# Patient Record
Sex: MALE | Race: WHITE | NOT HISPANIC OR LATINO | Employment: FULL TIME | URBAN - METROPOLITAN AREA
[De-identification: names, ages, dates, MRNs, and addresses within clinical notes are randomized per-mention and may not be internally consistent; named-entity substitution may affect disease eponyms.]

---

## 2022-04-09 ENCOUNTER — HISTORICAL (OUTPATIENT)
Dept: ADMINISTRATIVE | Facility: HOSPITAL | Age: 37
End: 2022-04-09

## 2022-04-25 VITALS
OXYGEN SATURATION: 96 % | HEIGHT: 73 IN | SYSTOLIC BLOOD PRESSURE: 138 MMHG | WEIGHT: 233.44 LBS | DIASTOLIC BLOOD PRESSURE: 70 MMHG | BODY MASS INDEX: 30.94 KG/M2

## 2022-07-13 PROBLEM — F98.8 ATTENTION DEFICIT DISORDER: Status: ACTIVE | Noted: 2022-07-13

## 2023-01-10 PROBLEM — Z00.00 WELLNESS EXAMINATION: Status: ACTIVE | Noted: 2023-01-10

## 2023-04-17 PROBLEM — Z00.00 WELLNESS EXAMINATION: Status: RESOLVED | Noted: 2023-01-10 | Resolved: 2023-04-17

## 2023-12-30 ENCOUNTER — OFFICE VISIT (OUTPATIENT)
Dept: URGENT CARE | Facility: CLINIC | Age: 38
End: 2023-12-30
Payer: COMMERCIAL

## 2023-12-30 VITALS
HEIGHT: 73 IN | HEART RATE: 79 BPM | OXYGEN SATURATION: 100 % | WEIGHT: 240 LBS | SYSTOLIC BLOOD PRESSURE: 134 MMHG | DIASTOLIC BLOOD PRESSURE: 89 MMHG | TEMPERATURE: 99 F | BODY MASS INDEX: 31.81 KG/M2 | RESPIRATION RATE: 18 BRPM

## 2023-12-30 DIAGNOSIS — H66.91 RIGHT OTITIS MEDIA, UNSPECIFIED OTITIS MEDIA TYPE: Primary | ICD-10-CM

## 2023-12-30 DIAGNOSIS — H60.391 OTHER INFECTIVE OTITIS EXTERNA OF RIGHT EAR, UNSPECIFIED CHRONICITY: ICD-10-CM

## 2023-12-30 DIAGNOSIS — H61.21 RIGHT EAR IMPACTED CERUMEN: ICD-10-CM

## 2023-12-30 PROCEDURE — 99214 PR OFFICE/OUTPT VISIT, EST, LEVL IV, 30-39 MIN: ICD-10-PCS | Mod: 25,,, | Performed by: FAMILY MEDICINE

## 2023-12-30 PROCEDURE — 69209 EAR CERUMEN REMOVAL: ICD-10-PCS | Mod: RT,,, | Performed by: FAMILY MEDICINE

## 2023-12-30 PROCEDURE — 69209 REMOVE IMPACTED EAR WAX UNI: CPT | Mod: RT,,, | Performed by: FAMILY MEDICINE

## 2023-12-30 PROCEDURE — 99214 OFFICE O/P EST MOD 30 MIN: CPT | Mod: 25,,, | Performed by: FAMILY MEDICINE

## 2023-12-30 RX ORDER — CEFDINIR 300 MG/1
300 CAPSULE ORAL 2 TIMES DAILY
Qty: 14 CAPSULE | Refills: 0 | Status: SHIPPED | OUTPATIENT
Start: 2023-12-30 | End: 2024-01-06

## 2023-12-30 NOTE — PROGRESS NOTES
"Subjective:      Patient ID: Da Roach is a 38 y.o. male.    Vitals:  height is 6' 1" (1.854 m) and weight is 108.9 kg (240 lb). His temperature is 98.5 °F (36.9 °C). His blood pressure is 134/89 and his pulse is 79. His respiration is 18 and oxygen saturation is 100%.     Chief Complaint: Ear Problem (Decreased hearing/loss of hearing completely in right ear. Went to another clinic 2 days ago and was told that his ear canal was closing and they inserted a wick w/ rx Cipro. Wick fell out yesterday. No improvement in loss of hearing. )    R ear pain and reduced swelling. Dx OE and given cipro and wick 2 days ago without improvement.  Continued reduced hearing and tinnitus.          Constitution: Negative for sweating, fatigue and fever.   HENT:  Positive for ear pain.       Objective:     Physical Exam   HENT:   Ears:   Right Ear: External ear normal.   Left Ear: Tympanic membrane, external ear and ear canal normal.      Comments: White and yellow exudate to the R canal with TM erythema.  Distal R canal with edema otherwise no diffuse edema.   Nose: Nose normal.   Mouth/Throat: Mucous membranes are moist.   Eyes: Pupils are equal, round, and reactive to light.   Cardiovascular: Normal rate.   Pulmonary/Chest: Effort normal.   Abdominal: Normal appearance.   Neurological: no focal deficit. He is alert.   Psychiatric: Mood normal.   Nursing note and vitals reviewed.      Assessment:     1. Right otitis media, unspecified otitis media type    2. Other infective otitis externa of right ear, unspecified chronicity    3. Right ear impacted cerumen        Plan:       Right otitis media, unspecified otitis media type  -     cefdinir (OMNICEF) 300 MG capsule; Take 1 capsule (300 mg total) by mouth 2 (two) times daily. for 7 days  Dispense: 14 capsule; Refill: 0    Other infective otitis externa of right ear, unspecified chronicity    Right ear impacted cerumen    Other orders  -     Ear Cerumen Removal           Ear " Cerumen Removal    Date/Time: 12/30/2023 9:30 AM    Performed by: Brielle Roberts MD  Authorized by: Brielle Roberts MD    Consent Done?:  Yes (Written)  Medication Used:  Other  Location details:  Right ear  Procedure type: irrigation    Cerumen  Removal Results:  Cerumen completely removed  Patient tolerance:  Patient tolerated the procedure well with no immediate complications

## 2024-01-18 PROBLEM — R03.0 ELEVATED BLOOD PRESSURE READING IN OFFICE WITHOUT DIAGNOSIS OF HYPERTENSION: Status: ACTIVE | Noted: 2024-01-18

## 2024-04-15 PROBLEM — Z00.00 WELLNESS EXAMINATION: Status: RESOLVED | Noted: 2023-01-10 | Resolved: 2024-04-15

## 2024-04-20 NOTE — PROGRESS NOTES
"Medication Refill       HPI:    Patient presents for 3 month recheck/refill medications.  Patient states medications are working well; he is able to concentrate, focus and stay on task.  He denies insomnia, anxiety, palpitations, unintentional weight loss, headaches or dry mouth.     reviewed.    Narcotics agreement updated 01/18/2024.      Current Outpatient Medications   Medication Instructions    lisdexamfetamine (VYVANSE) 50 mg, Oral, Every morning    lisdexamfetamine (VYVANSE) 50 mg, Oral, Every morning, Fill: 5/22/2024.    lisdexamfetamine (VYVANSE) 50 mg, Oral, Every morning, Fill: 6/21/2024    multivitamin (THERAGRAN) tablet 1 tablet, Oral, Daily         ROS:    Patient has been feeling well.    He denies any recent illnesses.    He has been sleeping well.    His appetite is good.    He is starting to exercise again.      PE:    ..Visit Vitals  /74   Pulse 72   Temp 97.7 °F (36.5 °C)   Ht 6' 1" (1.854 m)   Wt 109.3 kg (241 lb)   SpO2 100%   BMI 31.80 kg/m²        General:  He is well-developed well-nourished white male in no apparent distress.  He is alert and oriented.    Chest: Clear to auscultation bilaterally.    CV: Regular rate rhythm without murmurs rubs or gallops.        1. ADHD (attention deficit hyperactivity disorder), inattentive type  Overview:  Stable/patient doing well on current treatment, will continue to closely monitor, follow-up in 3 months. 3 prescriptions printed and hand given to patient at office visit today for 3-month supply. Risks/benefits/side effects of medication discussed with patient. Patient denies any insomnia or palpitations.    Narcotics agreement updated 01/11/2023.    07/20/2023: Patient is doing well on medications; refills x3 given.    10/20/2023: Patient is doing well on medications; refills x3 given.    01/18/2024: Patient is doing well on medications; refills x3 given.  Narcotics agreement updated.    04/23/2024:  Patient is doing well on medications; " refills x3 given.                Other orders  -     lisdexamfetamine (VYVANSE) 50 MG capsule; Take 1 capsule (50 mg total) by mouth every morning.  Dispense: 30 capsule; Refill: 0  -     lisdexamfetamine (VYVANSE) 50 MG capsule; Take 1 capsule (50 mg total) by mouth every morning. Fill: 5/22/2024.  Dispense: 30 capsule; Refill: 0  -     lisdexamfetamine (VYVANSE) 50 MG capsule; Take 1 capsule (50 mg total) by mouth every morning. Fill: 6/21/2024  Dispense: 30 capsule; Refill: 0              ..Follow up in about 3 months (around 7/23/2024) for Wellness.       Future Appointments   Date Time Provider Department Center   7/17/2024  8:30 AM NURSE, [facility] PRIMARY [facility]ARE [facility]   7/18/2024  8:30 AM Eduar Gastelum MD [facility]

## 2024-04-23 ENCOUNTER — OFFICE VISIT (OUTPATIENT)
Dept: PRIMARY CARE CLINIC | Facility: CLINIC | Age: 39
End: 2024-04-23
Payer: COMMERCIAL

## 2024-04-23 VITALS
SYSTOLIC BLOOD PRESSURE: 132 MMHG | BODY MASS INDEX: 31.94 KG/M2 | HEART RATE: 72 BPM | WEIGHT: 241 LBS | TEMPERATURE: 98 F | HEIGHT: 73 IN | OXYGEN SATURATION: 100 % | DIASTOLIC BLOOD PRESSURE: 74 MMHG

## 2024-04-23 DIAGNOSIS — F90.0 ADHD (ATTENTION DEFICIT HYPERACTIVITY DISORDER), INATTENTIVE TYPE: Primary | ICD-10-CM

## 2024-04-23 PROCEDURE — 99213 OFFICE O/P EST LOW 20 MIN: CPT | Mod: ,,, | Performed by: FAMILY MEDICINE

## 2024-04-23 PROCEDURE — 3078F DIAST BP <80 MM HG: CPT | Mod: CPTII,,, | Performed by: FAMILY MEDICINE

## 2024-04-23 PROCEDURE — 1159F MED LIST DOCD IN RCRD: CPT | Mod: CPTII,,, | Performed by: FAMILY MEDICINE

## 2024-04-23 PROCEDURE — 3075F SYST BP GE 130 - 139MM HG: CPT | Mod: CPTII,,, | Performed by: FAMILY MEDICINE

## 2024-04-23 PROCEDURE — 3008F BODY MASS INDEX DOCD: CPT | Mod: CPTII,,, | Performed by: FAMILY MEDICINE

## 2024-04-23 RX ORDER — LISDEXAMFETAMINE DIMESYLATE 50 MG/1
50 CAPSULE ORAL EVERY MORNING
Qty: 30 CAPSULE | Refills: 0 | Status: SHIPPED | OUTPATIENT
Start: 2024-04-23

## 2024-04-23 RX ORDER — LISDEXAMFETAMINE DIMESYLATE 50 MG/1
50 CAPSULE ORAL EVERY MORNING
Qty: 30 CAPSULE | Refills: 0 | Status: SHIPPED | OUTPATIENT
Start: 2024-04-23 | End: 2024-05-23

## 2024-07-11 DIAGNOSIS — Z00.00 WELLNESS EXAMINATION: Primary | ICD-10-CM

## 2024-07-18 ENCOUNTER — TELEPHONE (OUTPATIENT)
Dept: PRIMARY CARE CLINIC | Facility: CLINIC | Age: 39
End: 2024-07-18

## 2024-07-18 RX ORDER — LISDEXAMFETAMINE DIMESYLATE 50 MG/1
50 CAPSULE ORAL EVERY MORNING
Qty: 30 CAPSULE | Refills: 0 | Status: SHIPPED | OUTPATIENT
Start: 2024-07-18

## 2024-07-18 NOTE — TELEPHONE ENCOUNTER
----- Message from Ashley Hairston sent at 7/18/2024  9:15 AM CDT -----  .Who Called: Da Roach    Refill or New Rx:Refill  RX Name and Strength:lisdexamfetamine (VYVANSE) 50 MG capsule  How is the patient currently taking it? (ex. 1XDay):1x day  Is this a 30 day or 90 day RX:30  Local or Mail Order:local  List of preferred pharmacies on file (remove unneeded): [unfilled]  If different Pharmacy is requested, enter Pharmacy information here including location and phone number: same    Ordering Provider:Nirav      Preferred Method of Contact: Phone Call  Patient's Preferred Phone Number on File: [phone]   Best Call Back Number, if different:  Additional Information: pt overslept but asking for refill and will reschedule

## 2024-07-18 NOTE — TELEPHONE ENCOUNTER
Rescheduled appt to 7/30/24 would like to  1 prescription of  VYANSE today. Also states will do lab work tomorrow.

## 2024-07-26 NOTE — PROGRESS NOTES
..Annual Exam and Medication Refill (3 month med refill)       HPI:     Patient presents for wellness examination.    He has been feeling well.    He and his family just went to [place].   He has been sleeping well.   His appetite is normal   He is  with 3 children  He is a ; Timmy Tolliver.     He has 1-2 cups coffee a day.    He has alcohol 2-3 times a week.    He does not smoke.    He could exercise recently secondary to left foot injury.  Derm: Dr Montesinos; history of BCCA face.      The patient's Health Maintenance was reviewed and the following appears to be due at this time:   Health Maintenance Due   Topic Date Due    Hepatitis C Screening  Never done    HIV Screening  Never done    Hemoglobin A1c (Diabetic Prevention Screening)  Never done    COVID-19 Vaccine (3 - 2023-24 season) 09/01/2023       ..  Past Medical History:   Diagnosis Date    ADHD (attention deficit hyperactivity disorder), inattentive type     Refused influenza vaccine           ..  Past Surgical History:   Procedure Laterality Date    ABSCESS DRAINAGE      VASECTOMY            Current Outpatient Medications   Medication Instructions    lisdexamfetamine (VYVANSE) 50 mg, Oral, Every morning    lisdexamfetamine (VYVANSE) 50 mg, Oral, Every morning, Fill: 8/30/2024.    lisdexamfetamine (VYVANSE) 50 mg, Oral, Every morning, Fill: 9/30/2024.    multivitamin (THERAGRAN) tablet 1 tablet, Oral, Daily         ..  Social History     Socioeconomic History    Marital status:     Number of children: 2   Occupational History    Occupation:    Tobacco Use    Smoking status: Never    Smokeless tobacco: Never   Substance and Sexual Activity    Alcohol use: Yes     Alcohol/week: 1.0 - 2.0 standard drink of alcohol     Types: 1 - 2 Standard drinks or equivalent per week     Comment: occasional    Drug use: Never    Sexual activity: Yes     Partners: Female          ..  Family History   Problem Relation Name  Age of Onset    No Known Problems Mother      Hypertension Father      Prostate cancer Father      Skin cancer Father      No Known Problems Brother            ..  Review of patient's allergies indicates:   Allergen Reactions    Penicillins Other (See Comments)          ..  Immunization History   Administered Date(s) Administered    COVID-19, MRNA, LN-S, PF (Pfizer) (Purple Cap) 07/10/2021, 08/10/2021    DTP 1986, 01/19/1987, 03/19/1987, 04/13/1989, 08/20/1992    HIB 08/17/1990    Influenza - Quadrivalent - PF *Preferred* (6 months and older) 11/22/2019, 11/17/2020    Influenza - Trivalent - PF (ADULT) 11/22/2019, 11/17/2020    MMR 02/08/1988, 08/20/1992    OPV 1986, 01/28/1987, 04/13/1989, 08/20/1992    Td (ADULT) 07/12/2005, 07/12/2005    Tdap 11/06/2015          REVIEW OF SYSTEMS:    GENERAL: No weight loss, no weight gain, no fever, no fatigue, no chills, no night sweats  HEENT: No sore throat, no ear pain, no sinus pressure, no nasal congestion, no rhinorrhea, + decreased hearing, he saw audiologist, mild hearing loss on left, + tinnitus on left,  no snoring  VISION: No vision changes, no blurry vision, no double vision, no glaucoma, no cataracts, + glasses and contacts  LAST EYE EXAM: February/March 2023  NECK: no LAD  CARDIAC: No chest pain, no palpitations, no dyspnea on exertion, no orthopnea  RESPIRATORY: No cough, no wheezing, no sputum production, no SOB  GI: No abdominal pain, no N/V, no heartburn, no constipation, no diarrhea, no blood in stool, (- ) family history of Colon Ca  : No dysuria, no hematuria, no frequency, no urgency, no incontinence, no testicular pain/swelling, (+ ) family history of Prostate Ca: father diagnosed around age 65  MUSC/SKEL: No myalgia, no weakness, no edema, no arthralgia, no joint swelling/effusions  SKIN: No rashes, no hives, no itching, no sores  NEURO: No HA, no numbness, no tingling, no weakness, no dizziness  PSYCH: No anxiety, no depression, no  "irritability, no panic attacks, no s/i, no h/i, no hallucinations  ENDO: No polyuria, no polyphagia, no polydipsia  HEME: No bruising, no bleeding disorders, no signs of anemia.       PHYSICAL EXAM:    ..Visit Vitals  BP (!) (P) 162/102   Pulse 74   Temp 97.4 °F (36.3 °C)   Ht 6' 1" (1.854 m)   Wt 115.2 kg (253 lb 14.4 oz)   SpO2 98%   BMI 33.50 kg/m²        General: Well developed, well nourished white male in no apparent distress, alert and oriented x3  Skin: No rash or abnormal lesions  HEENT: Normocephalic, PERRLA, EOMI, mouth WNL, throat WNL, nares normal, EAC and TM WNL bilaterally  Neck: FROM, no LAD, no thyroid abnormalities palpable  Chest: CTA bilaterally, no wheezes crackles or rubs  Cardiac: RRR, no murmurs, rubs, gallops  Abdomen: Soft, nontender, nondistended, NBSx4, no rebound tenderness or guarding, no HSM  Extremities: No clubbing, cyanosis, or edema. Joints WNL, +2 DP/PT pulses bilaterally  Neuro: No sensory or motor defects noted. CN II-XII intact. Gait WNL.  Genital: normal testes, no hernias  Rectal:  Deferred      1. Wellness examination  Overview:  CBC, CMP, Lipids, UA ordered today; patient will return as he is not fasting.    He has been feeling well.    He has resumed exercising.  Derm:  Dr. Montesinos; has not seen him in over a year.  Patient advised to schedule follow-up.  Patient is without complaints or concerns at this time.    07/30/2024:  Patient presents for wellness examination.    He has been feeling well.  Patient injured left foot; it was sore for 5 weeks.    Patient advised to resume exercising regularly.  Patient's blood pressure is elevated x2 today; patient encouraged to monitor blood pressures and let us know how they are doing in 2 weeks.    His ADD is doing well on medications; patient advised to not take medication while blood pressure is elevated.  Labs pending; patient has order.        2. ADHD (attention deficit hyperactivity disorder), inattentive " type  Overview:  Stable/patient doing well on current treatment, will continue to closely monitor, follow-up in 3 months. 3 prescriptions printed and hand given to patient at office visit today for 3-month supply. Risks/benefits/side effects of medication discussed with patient. Patient denies any insomnia or palpitations.    Narcotics agreement updated 01/11/2023.    07/20/2023: Patient is doing well on medications; refills x3 given.    10/20/2023: Patient is doing well on medications; refills x3 given.    01/18/2024: Patient is doing well on medications; refills x3 given.  Narcotics agreement updated.    04/23/2024:  Patient is doing well on medications; refills x3 given.      07/30/2024: Patient is doing well on medications; refills x3 given.    Patient advised to not take medication while blood pressure is elevated.            3. Elevated blood pressure reading in office without diagnosis of hypertension  Overview:  Patient's blood pressure is elevated x2 today..  Patient advised to monitor pressures at home and let us know how they are doing.    Decrease sodium consumption.    Patient encouraged to lose weight.    07/30/2024: Patient advised to monitor blood pressures at home.  His readings are quite elevated today.    Patient advised to not take Vyvanse while he is readings are elevated.  Patient is to contact us in 2 weeks with his readings.  Patient advised to limit sodium consumption.      Other orders  -     lisdexamfetamine (VYVANSE) 50 MG capsule; Take 1 capsule (50 mg total) by mouth every morning.  Dispense: 30 capsule; Refill: 0  -     lisdexamfetamine (VYVANSE) 50 MG capsule; Take 1 capsule (50 mg total) by mouth every morning. Fill: 8/30/2024.  Dispense: 30 capsule; Refill: 0  -     lisdexamfetamine (VYVANSE) 50 MG capsule; Take 1 capsule (50 mg total) by mouth every morning. Fill: 9/30/2024.  Dispense: 30 capsule; Refill: 0         ..Follow up in about 3 months (around 10/30/2024) for ADD Follow  Up.     Future Appointments   Date Time Provider Department Center   10/29/2024  9:30 AM Eduar Gastelum MD [facility]    8/5/2025  9:30 AM Eduar Gastelum MD [facility]

## 2024-07-30 ENCOUNTER — OFFICE VISIT (OUTPATIENT)
Dept: PRIMARY CARE CLINIC | Facility: CLINIC | Age: 39
End: 2024-07-30
Payer: COMMERCIAL

## 2024-07-30 VITALS
DIASTOLIC BLOOD PRESSURE: 98 MMHG | TEMPERATURE: 97 F | HEART RATE: 74 BPM | HEIGHT: 73 IN | BODY MASS INDEX: 33.65 KG/M2 | WEIGHT: 253.88 LBS | SYSTOLIC BLOOD PRESSURE: 158 MMHG | OXYGEN SATURATION: 98 %

## 2024-07-30 DIAGNOSIS — R03.0 ELEVATED BLOOD PRESSURE READING IN OFFICE WITHOUT DIAGNOSIS OF HYPERTENSION: ICD-10-CM

## 2024-07-30 DIAGNOSIS — Z00.00 WELLNESS EXAMINATION: Primary | ICD-10-CM

## 2024-07-30 DIAGNOSIS — F90.0 ADHD (ATTENTION DEFICIT HYPERACTIVITY DISORDER), INATTENTIVE TYPE: ICD-10-CM

## 2024-07-30 PROCEDURE — 3008F BODY MASS INDEX DOCD: CPT | Mod: CPTII,,, | Performed by: FAMILY MEDICINE

## 2024-07-30 PROCEDURE — 99395 PREV VISIT EST AGE 18-39: CPT | Mod: ,,, | Performed by: FAMILY MEDICINE

## 2024-07-30 PROCEDURE — 1160F RVW MEDS BY RX/DR IN RCRD: CPT | Mod: CPTII,,, | Performed by: FAMILY MEDICINE

## 2024-07-30 PROCEDURE — 1159F MED LIST DOCD IN RCRD: CPT | Mod: CPTII,,, | Performed by: FAMILY MEDICINE

## 2024-07-30 PROCEDURE — 3077F SYST BP >= 140 MM HG: CPT | Mod: CPTII,,, | Performed by: FAMILY MEDICINE

## 2024-07-30 PROCEDURE — 3080F DIAST BP >= 90 MM HG: CPT | Mod: CPTII,,, | Performed by: FAMILY MEDICINE

## 2024-07-30 RX ORDER — LISDEXAMFETAMINE DIMESYLATE 50 MG/1
50 CAPSULE ORAL EVERY MORNING
Qty: 30 CAPSULE | Refills: 0 | Status: SHIPPED | OUTPATIENT
Start: 2024-07-30 | End: 2024-08-29

## 2024-07-30 RX ORDER — LISDEXAMFETAMINE DIMESYLATE 50 MG/1
50 CAPSULE ORAL EVERY MORNING
Qty: 30 CAPSULE | Refills: 0 | Status: SHIPPED | OUTPATIENT
Start: 2024-07-30

## 2024-08-22 ENCOUNTER — TELEPHONE (OUTPATIENT)
Dept: PRIMARY CARE CLINIC | Facility: CLINIC | Age: 39
End: 2024-08-22
Payer: COMMERCIAL

## 2024-08-22 NOTE — TELEPHONE ENCOUNTER
----- Message from Ashley Hairston sent at 8/22/2024 12:11 PM CDT -----  .Who Called: Da Roach        Preferred Method of Contact: Phone Call  Patient's Preferred Phone Number on File: [phone]   Best Call Back Number, if different:[phone]  Additional Information: pharmacy called need dx code  lisdexamfetamine (VYVANSE) 50 MG capsule

## 2024-08-26 ENCOUNTER — TELEPHONE (OUTPATIENT)
Dept: PRIMARY CARE CLINIC | Facility: CLINIC | Age: 39
End: 2024-08-26
Payer: COMMERCIAL

## 2024-08-26 NOTE — TELEPHONE ENCOUNTER
----- Message from Eduar Gastelum MD sent at 8/26/2024 11:01 AM CDT -----   Diagnosis code 90.0.  ----- Message -----  From: Claudia Jaimes LPN  Sent: 8/26/2024  10:42 AM CDT  To: Eduar Gastelum MD      ----- Message -----  From: Munira Roberts  Sent: 8/23/2024  12:50 PM CDT  To: Nirav BOLA[facility]OS Staff    .Type:  Patient Returning Call    Who Called:pt  Who Left Message for Patient:pt  Does the patient know what this is regarding?:lisdexamfetamine (VYVANSE) 50 MG capsule  Would the patient rather a call back or a response via [facility]?   Best Call Back Number:[phone]   Additional Information: Please call the pharmacy with a diagnosis code on lisdexamfetamine (VYVANSE) 50 MG capsule  Please call [facility] on [address]   Please call back

## 2024-10-29 ENCOUNTER — OFFICE VISIT (OUTPATIENT)
Dept: PRIMARY CARE CLINIC | Facility: CLINIC | Age: 39
End: 2024-10-29
Payer: COMMERCIAL

## 2024-10-29 VITALS
TEMPERATURE: 99 F | BODY MASS INDEX: 33.66 KG/M2 | HEART RATE: 64 BPM | HEIGHT: 73 IN | OXYGEN SATURATION: 98 % | WEIGHT: 254 LBS | SYSTOLIC BLOOD PRESSURE: 158 MMHG | DIASTOLIC BLOOD PRESSURE: 92 MMHG

## 2024-10-29 DIAGNOSIS — Z28.21 REFUSED INFLUENZA VACCINE: ICD-10-CM

## 2024-10-29 DIAGNOSIS — R03.0 ELEVATED BLOOD PRESSURE READING IN OFFICE WITHOUT DIAGNOSIS OF HYPERTENSION: ICD-10-CM

## 2024-10-29 DIAGNOSIS — F90.0 ADHD (ATTENTION DEFICIT HYPERACTIVITY DISORDER), INATTENTIVE TYPE: Primary | ICD-10-CM

## 2024-10-29 PROCEDURE — 3080F DIAST BP >= 90 MM HG: CPT | Mod: CPTII,,, | Performed by: FAMILY MEDICINE

## 2024-10-29 PROCEDURE — 1160F RVW MEDS BY RX/DR IN RCRD: CPT | Mod: CPTII,,, | Performed by: FAMILY MEDICINE

## 2024-10-29 PROCEDURE — 3077F SYST BP >= 140 MM HG: CPT | Mod: CPTII,,, | Performed by: FAMILY MEDICINE

## 2024-10-29 PROCEDURE — 1159F MED LIST DOCD IN RCRD: CPT | Mod: CPTII,,, | Performed by: FAMILY MEDICINE

## 2024-10-29 PROCEDURE — 3008F BODY MASS INDEX DOCD: CPT | Mod: CPTII,,, | Performed by: FAMILY MEDICINE

## 2024-10-29 PROCEDURE — 99214 OFFICE O/P EST MOD 30 MIN: CPT | Mod: ,,, | Performed by: FAMILY MEDICINE

## 2024-10-29 RX ORDER — LISDEXAMFETAMINE DIMESYLATE 50 MG/1
50 CAPSULE ORAL EVERY MORNING
Qty: 30 CAPSULE | Refills: 0 | Status: SHIPPED | OUTPATIENT
Start: 2024-10-29 | End: 2024-11-28

## 2024-10-29 RX ORDER — LISDEXAMFETAMINE DIMESYLATE 50 MG/1
50 CAPSULE ORAL EVERY MORNING
Qty: 30 CAPSULE | Refills: 0 | Status: SHIPPED | OUTPATIENT
Start: 2024-10-29

## 2025-01-07 ENCOUNTER — OFFICE VISIT (OUTPATIENT)
Dept: URGENT CARE | Facility: CLINIC | Age: 40
End: 2025-01-07
Payer: COMMERCIAL

## 2025-01-07 VITALS
DIASTOLIC BLOOD PRESSURE: 94 MMHG | RESPIRATION RATE: 18 BRPM | HEIGHT: 73 IN | OXYGEN SATURATION: 100 % | SYSTOLIC BLOOD PRESSURE: 156 MMHG | TEMPERATURE: 97 F | BODY MASS INDEX: 30.48 KG/M2 | WEIGHT: 230 LBS | HEART RATE: 79 BPM

## 2025-01-07 DIAGNOSIS — J02.9 SORE THROAT: Primary | ICD-10-CM

## 2025-01-07 DIAGNOSIS — J02.0 STREP PHARYNGITIS: ICD-10-CM

## 2025-01-07 LAB
CTP QC/QA: YES
MOLECULAR STREP A: POSITIVE

## 2025-01-07 PROCEDURE — 87651 STREP A DNA AMP PROBE: CPT | Mod: QW,,, | Performed by: FAMILY MEDICINE

## 2025-01-07 PROCEDURE — 96372 THER/PROPH/DIAG INJ SC/IM: CPT | Mod: ,,, | Performed by: FAMILY MEDICINE

## 2025-01-07 PROCEDURE — 99213 OFFICE O/P EST LOW 20 MIN: CPT | Mod: 25,,, | Performed by: FAMILY MEDICINE

## 2025-01-07 RX ORDER — DEXAMETHASONE SODIUM PHOSPHATE 10 MG/ML
10 INJECTION INTRAMUSCULAR; INTRAVENOUS
Status: COMPLETED | OUTPATIENT
Start: 2025-01-07 | End: 2025-01-07

## 2025-01-07 RX ORDER — AMOXICILLIN 500 MG/1
500 TABLET, FILM COATED ORAL EVERY 12 HOURS
Qty: 20 TABLET | Refills: 0 | Status: SHIPPED | OUTPATIENT
Start: 2025-01-07 | End: 2025-01-17

## 2025-01-07 RX ADMIN — DEXAMETHASONE SODIUM PHOSPHATE 10 MG: 10 INJECTION INTRAMUSCULAR; INTRAVENOUS at 11:01

## 2025-01-07 NOTE — PROGRESS NOTES
"Patient ID: Da Roach is a 39 y.o. male.  Chief Complaint: No chief complaint on file.    HPI:   Patient presents here today for above reason.     39-year-old  presents to urgent care today with complaints of sore throat and lymphadenopathy left side.  Additional symptoms include fever.        Past Medical History:  Past Medical History:   Diagnosis Date    ADHD (attention deficit hyperactivity disorder), inattentive type     Refused influenza vaccine      Past Surgical History:   Procedure Laterality Date    ABSCESS DRAINAGE      VASECTOMY       Review of patient's allergies indicates:   Allergen Reactions    Penicillins Other (See Comments)     Current Outpatient Medications   Medication Instructions    amoxicillin (AMOXIL) 500 mg, Oral, Every 12 hours    lisdexamfetamine (VYVANSE) 50 mg, Oral, Every morning    multivitamin (THERAGRAN) tablet 1 tablet, Daily     Social History     Socioeconomic History    Marital status:     Number of children: 2   Occupational History    Occupation:    Tobacco Use    Smoking status: Never    Smokeless tobacco: Never   Substance and Sexual Activity    Alcohol use: Yes     Alcohol/week: 1.0 - 2.0 standard drink of alcohol     Types: 1 - 2 Standard drinks or equivalent per week     Comment: occasional    Drug use: Never    Sexual activity: Yes     Partners: Female       ROS:   Review of Systems  12 point review of systems conducted, negative except as stated in the history of present illness. See HPI for details.   Vitals/PE:   Visit Vitals  BP (!) 156/94   Pulse 79   Temp 97 °F (36.1 °C)   Resp 18   Ht 6' 1" (1.854 m)   Wt 104.3 kg (230 lb)   SpO2 100%   BMI 30.34 kg/m²     Physical Exam  Vitals and nursing note reviewed.   Constitutional:       Appearance: He is not ill-appearing, toxic-appearing or diaphoretic.   HENT:      Head: Normocephalic.      Nose: Mucosal edema and congestion present.      Right Turbinates: Enlarged and swollen.      " Left Turbinates: Enlarged and swollen.      Right Sinus: Maxillary sinus tenderness and frontal sinus tenderness present.      Left Sinus: Maxillary sinus tenderness and frontal sinus tenderness present.      Mouth/Throat:      Pharynx: Pharyngeal swelling, oropharyngeal exudate and posterior oropharyngeal erythema present.   Eyes:      Pupils: Pupils are equal, round, and reactive to light.   Cardiovascular:      Rate and Rhythm: Normal rate.      Pulses: Normal pulses.   Pulmonary:      Effort: Pulmonary effort is normal.   Abdominal:      General: Abdomen is flat.   Musculoskeletal:         General: Normal range of motion.   Skin:     General: Skin is warm.      Capillary Refill: Capillary refill takes less than 2 seconds.   Neurological:      General: No focal deficit present.      Mental Status: He is alert and oriented to person, place, and time.   Psychiatric:         Mood and Affect: Mood normal.         Behavior: Behavior normal.         Results for orders placed or performed in visit on 01/07/25   POCT Strep A, Molecular    Collection Time: 01/07/25 10:48 AM   Result Value Ref Range    Molecular Strep A, POC Positive (A) Negative     Acceptable Yes      Assessment/Plan:   Sore throat  -     POCT Strep A, Molecular    Strep pharyngitis  -     amoxicillin (AMOXIL) 500 MG Tab; Take 1 tablet (500 mg total) by mouth every 12 (twelve) hours. for 10 days  Dispense: 20 tablet; Refill: 0  -     dexAMETHasone injection 10 mg  Previously listed allergy to penicillin.  However he is adamant that he has taken amoxicillin numerous times in the past.  No allergic reaction noted.  He expresses that in childhood/infancy he was told by his parents that he had a reaction to penicillins.  However, again he has taken this medication numerous times over many years and not had any allergic reaction.  Additional clarification was had and he agrees that he has taken amoxicillin and azithromycin and had no reaction  or side effects from either.     Orders Placed This Encounter   Procedures    POCT Strep A, Molecular       Education and counseling done face to face regarding medical conditions and plan. Contact office if new symptoms develop. Should any symptoms ever significantly worsen seek emergency medical attention/go to ER. Follow up at least yearly for wellness or sooner PRN. Nurse to call patient with any results. The patient is receptive, expresses understanding and is agreeable to plan. All questions have been answered.

## 2025-01-07 NOTE — PATIENT INSTRUCTIONS
Assessment/Plan:   Sore throat  -     POCT Strep A, Molecular    Strep pharyngitis  -     amoxicillin (AMOXIL) 500 MG Tab; Take 1 tablet (500 mg total) by mouth every 12 (twelve) hours. for 10 days  Dispense: 20 tablet; Refill: 0  -     dexAMETHasone injection 10 mg  Previously listed allergy to penicillin.  However he is adamant that he has taken amoxicillin numerous times in the past.  No allergic reaction noted.  He expresses that in childhood/infancy he was told by his parents that he had a reaction to penicillins.  However, again he has taken this medication numerous times over many years and not had any allergic reaction.  Additional clarification was had and he agrees that he has taken amoxicillin and azithromycin and had no reaction or side effects from either.     Orders Placed This Encounter   Procedures    POCT Strep A, Molecular       Education and counseling done face to face regarding medical conditions and plan. Contact office if new symptoms develop. Should any symptoms ever significantly worsen seek emergency medical attention/go to ER. Follow up at least yearly for wellness or sooner PRN. Nurse to call patient with any results. The patient is receptive, expresses understanding and is agreeable to plan. All questions have been answered.

## 2025-01-31 NOTE — PROGRESS NOTES
"Medication Refill (3 month med refill /Contusion to left forearm)       HPI:    Patient presents for 3 month recheck/refill medications.  Patient states medications are working well; he is able to concentrate, focus and stay on task.  He denies insomnia, anxiety, palpitations, unintentional weight loss, headaches or dry mouth.     reviewed.          Current Outpatient Medications   Medication Instructions    ascorbic acid (vitamin C) (VITAMIN C) 750 mg, Daily    lisdexamfetamine (VYVANSE) 50 mg, Oral, Every morning    lisdexamfetamine (VYVANSE) 50 mg, Oral, Every morning    lisdexamfetamine (VYVANSE) 50 mg, Oral, Every morning, Fill: 4/03/2025.    lisinopriL-hydrochlorothiazide (PRINZIDE,ZESTORETIC) 10-12.5 mg per tablet 1 tablet, Oral, Daily    multivitamin (THERAGRAN) tablet 1 tablet, Daily         ROS:    Patient has been feeling well.    He was diagnosed with strep throat or 01/07/2025.  He has been sleeping well.    His appetite is good.  His blood pressures have been in the upper 140's/80's. + family history of hypertension: father.    He bruised his left forearm shooting a bow and arrow.  He had his arm with the string and developed immediate bruising  It was very painful initially.  He still feels a lump to this area but does not have any discomfort.      PE:    ..Visit Vitals  BP (!) 151/92   Pulse 79   Temp 98.7 °F (37.1 °C)   Ht 6' 1" (1.854 m)   Wt 115 kg (253 lb 9.6 oz)   SpO2 98%   BMI 33.46 kg/m²        General:  He is well-developed well-nourished white male in no apparent distress.  He is alert and oriented.    Chest: Clear to auscultation bilaterally.    CV: Regular rate rhythm without murmurs rubs or gallops.  Bilateral lower extremities: Without edema.  Left mid ventral forearm:  Small area of fullness noted consistent with hematoma, no overlying skin changes or tenderness to palpation        1. ADHD (attention deficit hyperactivity disorder), inattentive type  Overview:  Stable/patient doing " well on current treatment, will continue to closely monitor, follow-up in 3 months. 3 prescriptions printed and hand given to patient at office visit today for 3-month supply. Risks/benefits/side effects of medication discussed with patient. Patient denies any insomnia or palpitations.    Narcotics agreement updated 01/11/2023.    07/20/2023: Patient is doing well on medications; refills x3 given.    10/20/2023: Patient is doing well on medications; refills x3 given.    01/18/2024: Patient is doing well on medications; refills x3 given.  Narcotics agreement updated.    04/23/2024:  Patient is doing well on medications; refills x3 given.      07/30/2024: Patient is doing well on medications; refills x3 given.    Patient advised to not take medication while blood pressure is elevated.          Assessment & Plan:  Patient is doing well medications; refills x3 for each medication given.    Orders:  -     lisdexamfetamine (VYVANSE) 50 MG capsule; Take 1 capsule (50 mg total) by mouth every morning.  Dispense: 30 capsule; Refill: 0  -     lisdexamfetamine (VYVANSE) 50 MG capsule; Take 1 capsule (50 mg total) by mouth every morning.  Dispense: 30 capsule; Refill: 0  -     lisdexamfetamine (VYVANSE) 50 MG capsule; Take 1 capsule (50 mg total) by mouth every morning. Fill: 4/03/2025.  Dispense: 30 capsule; Refill: 0    2. Primary hypertension  Assessment & Plan:  Patient's blood pressures continue to be elevated; will start lisinopril HCT 10-12.5 mg daily.  Monitor pressures and bring record in one-month.  Patient advised to hold Vyvanse until blood pressures are controlled.      3. Hematoma of arm, left, initial encounter  Assessment & Plan:  Patient developed a hematoma to his left forearm after shooting a bow and arrow with the bow striking his arm.  Patient reassured that hematoma will slowly resolve.      4. Refused influenza vaccine  Overview:  Patient declines flu shot at this time; benefits/potential risks/side  effects discussed with patient.       Other orders  -     Discontinue: lisinopriL-hydrochlorothiazide (PRINZIDE,ZESTORETIC) 10-12.5 mg per tablet; Take 1 tablet by mouth once daily.  Dispense: 30 tablet; Refill: 1  -     Discontinue: lisinopriL-hydrochlorothiazide (PRINZIDE,ZESTORETIC) 10-12.5 mg per tablet; Take 1 tablet by mouth once daily.  Dispense: 30 tablet; Refill: 1              ..Follow up in about 1 month (around 3/3/2025) for Blood Pressure Check.       Future Appointments   Date Time Provider Department Center   3/6/2025  4:00 PM Eduar Gastelum MD [facility]   8/5/2025  9:30 AM Eduar Gastelum MD [facility]

## 2025-02-03 ENCOUNTER — OFFICE VISIT (OUTPATIENT)
Dept: PRIMARY CARE CLINIC | Facility: CLINIC | Age: 40
End: 2025-02-03
Payer: COMMERCIAL

## 2025-02-03 VITALS
DIASTOLIC BLOOD PRESSURE: 92 MMHG | WEIGHT: 253.63 LBS | BODY MASS INDEX: 33.61 KG/M2 | SYSTOLIC BLOOD PRESSURE: 151 MMHG | HEART RATE: 79 BPM | TEMPERATURE: 99 F | HEIGHT: 73 IN | OXYGEN SATURATION: 98 %

## 2025-02-03 DIAGNOSIS — F90.0 ADHD (ATTENTION DEFICIT HYPERACTIVITY DISORDER), INATTENTIVE TYPE: Primary | ICD-10-CM

## 2025-02-03 DIAGNOSIS — I10 PRIMARY HYPERTENSION: ICD-10-CM

## 2025-02-03 DIAGNOSIS — S40.022A HEMATOMA OF ARM, LEFT, INITIAL ENCOUNTER: ICD-10-CM

## 2025-02-03 DIAGNOSIS — Z28.21 REFUSED INFLUENZA VACCINE: ICD-10-CM

## 2025-02-03 PROCEDURE — 99214 OFFICE O/P EST MOD 30 MIN: CPT | Mod: ,,, | Performed by: FAMILY MEDICINE

## 2025-02-03 PROCEDURE — 1159F MED LIST DOCD IN RCRD: CPT | Mod: CPTII,,, | Performed by: FAMILY MEDICINE

## 2025-02-03 PROCEDURE — 3077F SYST BP >= 140 MM HG: CPT | Mod: CPTII,,, | Performed by: FAMILY MEDICINE

## 2025-02-03 PROCEDURE — 1160F RVW MEDS BY RX/DR IN RCRD: CPT | Mod: CPTII,,, | Performed by: FAMILY MEDICINE

## 2025-02-03 PROCEDURE — 3080F DIAST BP >= 90 MM HG: CPT | Mod: CPTII,,, | Performed by: FAMILY MEDICINE

## 2025-02-03 PROCEDURE — 3008F BODY MASS INDEX DOCD: CPT | Mod: CPTII,,, | Performed by: FAMILY MEDICINE

## 2025-02-03 RX ORDER — ASCORBIC ACID 250 MG
750 TABLET,CHEWABLE ORAL DAILY
COMMUNITY

## 2025-02-03 RX ORDER — LISDEXAMFETAMINE DIMESYLATE 50 MG/1
50 CAPSULE ORAL EVERY MORNING
Qty: 30 CAPSULE | Refills: 0 | Status: SHIPPED | OUTPATIENT
Start: 2025-02-03

## 2025-02-03 RX ORDER — LISINOPRIL AND HYDROCHLOROTHIAZIDE 10; 12.5 MG/1; MG/1
1 TABLET ORAL DAILY
Qty: 30 TABLET | Refills: 1 | Status: SHIPPED | OUTPATIENT
Start: 2025-02-03 | End: 2025-02-05 | Stop reason: SDUPTHER

## 2025-02-03 RX ORDER — LISDEXAMFETAMINE DIMESYLATE 50 MG/1
50 CAPSULE ORAL EVERY MORNING
Qty: 30 CAPSULE | Refills: 0 | Status: SHIPPED | OUTPATIENT
Start: 2025-02-03 | End: 2025-03-05

## 2025-02-03 RX ORDER — LISINOPRIL AND HYDROCHLOROTHIAZIDE 10; 12.5 MG/1; MG/1
1 TABLET ORAL DAILY
Qty: 30 TABLET | Refills: 1 | Status: SHIPPED | OUTPATIENT
Start: 2025-02-03 | End: 2025-02-03 | Stop reason: SDUPTHER

## 2025-02-03 NOTE — ASSESSMENT & PLAN NOTE
Patient developed a hematoma to his left forearm after shooting a bow and arrow with the bow striking his arm.  Patient reassured that hematoma will slowly resolve.

## 2025-02-05 DIAGNOSIS — I10 PRIMARY HYPERTENSION: Primary | ICD-10-CM

## 2025-02-05 RX ORDER — LISINOPRIL AND HYDROCHLOROTHIAZIDE 10; 12.5 MG/1; MG/1
1 TABLET ORAL DAILY
Qty: 30 TABLET | Refills: 1 | Status: SHIPPED | OUTPATIENT
Start: 2025-02-05 | End: 2025-04-06

## 2025-02-28 NOTE — ASSESSMENT & PLAN NOTE
Patient's blood pressures continue to be elevated; will start lisinopril HCT 10-12.5 mg daily.  Monitor pressures and bring record in one-month.  Patient advised to hold Vyvanse until blood pressures are controlled.

## 2025-03-17 ENCOUNTER — PATIENT OUTREACH (OUTPATIENT)
Facility: CLINIC | Age: 40
End: 2025-03-17
Payer: COMMERCIAL

## 2025-03-17 NOTE — Clinical Note
Lipid panel 7/28/2021 Q 5 years. Dr. Gastelum Obtain from Dr. Gastelum's previous practice...[facility].

## 2025-03-17 NOTE — LETTER
AUTHORIZATION FOR RELEASE OF   CONFIDENTIAL INFORMATION      We are seeing Da Herrmann, date of birth 1985, in the clinic at [facility]. Eduar Gastelum MD is the patient's PCP. Da Herrmann has an outstanding lab/procedure at the time we reviewed his chart. In order to help keep his health information updated, he has authorized us to request the following medical record(s):                        (X) last lipid panel       Please fax records to [facility], Manuel, Chad B., MD,  at [phone] or email to [email or web address].              Patient Name: Da Herrmann  : 1985  Patient Phone #: [phone]                  Da Herrmann  MRN: [id number]  : 1985  Age: 38 y.o.  Sex: male         Patient/Legal Guardian Signature  This signature was collected at 2024    da herrmann       _______________________________   Printed Name/Relationship to Patient      Consent for Examination and Treatment: I hereby authorize the providers and employees of [facility] ([facility]) to provide medical treatment/services which includes, but is not limited to, performing and administering tests and diagnostic procedures that are deemed necessary, including, but not limited to, imaging examinations, blood tests and other laboratory procedures as may be required by the hospital, clinic, or may be ordered by my physician(s) or persons working under the general and/or special instructions of my physician(s).      I understand and agree that this consent covers all authorized persons, including but not limited to physicians, residents, nurse practitioners, physicians' assistants, specialists, consultants, student nurses, and independently contracted physicians, who are called upon by the physician in charge, to carry out the diagnostic procedures and medical or surgical treatment.     I hereby authorize [facility] to retain or dispose of any specimens or tissue, should there be  such remaining from any test or procedure.     I hereby authorize and give consent for [facility] providers and employees to take photographs, images or videotapes of such diagnostic, surgical or treatment procedures of Patient as may be required by [facility] or as may be ordered by a physician. I further acknowledge and agree that [facility] may use cameras or other devices for patient monitoring.     I am aware that the practice of medicine is not an exact science, and I acknowledge that no guarantees have been made to me as to the outcome of any tests, procedures or treatment.     Authorization for Release of Information: I understand that my insurance company and/or their agents may need information necessary to make determinations about payment/reimbursement. I hereby provide authorization to release to all insurance companies, their successors, assignees, other parties with whom they may have contracted, or others acting on their behalf, that are involved with payment for any hospital and/or clinic charges incurred by the patient, any information that they request and deem necessary for payment/reimbursement, and/or quality review.  I further authorize the release of my health information to physicians or other health care practitioners on staff who are involved in my health care now and in the future, and to other health care providers, entities, or institutions for the purpose of my continued care and treatment, including referrals.     REGISTRATION AUTHORIZATION  Form No. 61165 (Rev. 3/25/2024)    Page 1 of 3                       Medicare Patient's Certification and Authorization to Release Information and Payment Request:  I certify that the information given by me in applying for payment under Title XVIII of the Social Security Act is correct. I authorize any cisneros of medical or other information about me to release to the Social SecurityAdministration, or its intermediaries or carriers, any information needed  for this or a related Medicare claim. I request that payment of authorized benefits be made on my behalf.     Assignment of Insurance Benefits:   I hereby authorize any and all insurance companies, health plans, defined   benefit plans, health insurers or any entity that is or may be responsible for payment of my medical expenses to pay all hospital and medical benefits now due, and to become due and payable to me under any hospital benefits, sick benefits, injury benefits or any other benefit for services rendered to me, including Major Medical Benefits, direct to [facility] and all independently contracted physicians. I assign any and all rights that I may have against any and all insurance companies, health plans, defined benefit plans, health insurers or any entity that is or may be responsible for payment of my medical expenses, including, but not limited to any right to appeal a denial of a claim, any right to bring any action, lawsuit, administrative proceeding, or other cause of action on my behalf. I specifically assign my right to pursue litigation against any and all insurance companies, health plans, defined benefit plans, health insurers or any entity that is or may be responsible for payment of my medical expenses based upon a refusal to pay charges.            E. Valuables: It is understood and agreed that [facility] is not liable for the damage to or loss of any money, jewelry,   documents, dentures, eye glasses, hearing aids, prosthetics, or other property of value.     F. Computer Equipment: I understand and agree that should I choose to use computer equipment owned by [facility] or if I choose to access the Internet via [facility]s network, I do so at my own risk. [facility] is not responsible for any damage to my computer equipment or to any damages of any type that might arise from my loss of equipment or data.     G. Acceptance of Financial Responsibility:  I agree that in consideration of the services and    supplies that have been   or will be furnished to the patient, I am hereby obligated to pay all charges made for or on the account of the patient according to the standard rates (in effect at the time the services and supplies are delivered) established by [facility], including its Patient Financial Assistance Policy to the extent it is applicable. I understand that I am responsible for all charges, or portions thereof, not covered by insurance or other sources. Patient refunds will be distributed only after balances at all [facility] facilities are paid.     H. Communication Authorization:  I hereby authorize [facility] and its representatives, along with any billing service   or  who may work on their behalf, to contact me on   my cell phone and/or home phone using pre- recorded messages, artificial voice messages, automatic telephone dialing devices or other computer assisted technology, or by electronic      mail, text messaging, or by any other form of electronic communication. This includes, but is not limited to, appointment reminders, yearly physical exam reminders, preventive care reminders, patient campaigns, welcome calls, and calls about account balances on my account or any account on which I am listed as a guarantor. I understand I have the right to opt out of these communications at any time.      Relationship  Between  Facility and  Provider:      I understand that some, but not all, providers furnishing services to the patient are not employees or agents of [facility]. The patient is under the care and supervision of his/her attending physician, and it is the responsibility of the facility and its nursing staff to carry out the instructions of such physicians. It is the responsibility of the patient's physician/designee to obtain the patient's informed consent, when required, for medical or surgical treatment, special diagnostic or therapeutic procedures, or hospital services rendered for the  patient under the special instructions of the physician/designee.           REGISTRATION AUTHORIZATION  Form No. 86095 (Rev. 3/25/2024)    Page 2 of 3                       Immunizations: [facility] shares immunization information with state sponsored health departments to help you and your doctor keep track of your immunization records. By signing, you consent to have this information shared with the health department in your state:                                [place] - LINKS ([place] Immunization Network for Kids Statewide)                                [place] - MIIX ([place] Immunization Information eXchange)                                [place] - ImmPRINT (Immunization Patient Registry with Integrated Technology)     TERM: This authorization is valid for this and subsequent care/treatment I receive at [facility] and will remain valid unless/until revoked in writing by me.     [facility]: As used in this document, [facility] means all [facility] owned and managed facilities, including, but not limited to, all health centers, surgery centers, clinics, urgent care centers, and hospitals.         [facility] complies with applicable Federal civil rights laws and does not discriminate on the basis of race, color, national origin, age, disability, or sex.  ATENCIÓN: si habla español, tiene a loya disposición servicios gratuitos de asistencia lingüística. Llame al [phone].  CHÚ Ý: N?u b?n nói Ti?ng Vi?t, có các d?ch v? h? tr? ngôn ng? mi?n phí dành cho b?n. G?i s? [phone].        REGISTRATION AUTHORIZATION  Form No. 01700 (Rev. 3/25/2024)   Page 3 of 3     Patient

## 2025-03-17 NOTE — PROGRESS NOTES
Value base Outreach  No call needed.    Health Maintenance Topic(s) Outreach Outcomes & Actions Taken:    Primary Care Appt - Outreach Outcomes & Actions Taken  : Annual Wellness 8/5/2025, has PCP follow up 4/10/2025    Blood Pressure - Outreach Outcomes & Actions Taken  : Pt. Has follow up.    Lab(s) - Outreach Outcomes & Actions Taken  : Request previous Lipid, , A1c prevention pending AW appt. Hep C and HIV never done          ,

## 2025-04-05 NOTE — PROGRESS NOTES
"Follow-up (New blood pressure medication taking half dose)       HPI:    Pt presents for blood pressure recheck.  When he took whole tablet, his blood pressures dropped too low.   So he has been taking 1/2 tablet. His blood pressures have been in the 110's/70's.  Patient states he is feeling better since his blood pressure is controlled.    Current Outpatient Medications   Medication Instructions    ascorbic acid (vitamin C) (VITAMIN C) 750 mg, Daily    lisdexamfetamine (VYVANSE) 50 mg, Oral, Every morning    lisinopriL-hydrochlorothiazide (PRINZIDE,ZESTORETIC) 10-12.5 mg per tablet 1 tablet, Oral, Daily    multivitamin (THERAGRAN) tablet 1 tablet, Daily         ROS:    See HPI.      PE:    ..Visit Vitals  /78   Pulse 65   Temp 98.5 °F (36.9 °C)   Ht 6' 1" (1.854 m)   Wt 110.7 kg (244 lb)   SpO2 98%   BMI 32.19 kg/m²        General:  He is well-developed well-nourished white male in no apparent distress.  He is alert and oriented.    Chest: Clear to auscultation bilaterally.    CV: Regular rate rhythm without murmurs rubs or gallops.  Bilateral lower extremities: Without edema.        1. Primary hypertension  Overview:  04/10/2025: Patient's blood pressure dropped too low with full dosage of lisinopril HCT 10-12.5.  Therefore, patient has been taking 1/2 tablet.  Home blood pressures has been in the 110s over 70s.  Continue taking 1/2 tablet daily; refills sent.  Patient advised he may resume weight lifting.    Orders:  -     lisinopriL-hydrochlorothiazide (PRINZIDE,ZESTORETIC) 10-12.5 mg per tablet; Take 1 tablet by mouth once daily.  Dispense: 30 tablet; Refill: 5                ..Follow up in about 1 month (around 5/10/2025) for ADD Follow Up.       Future Appointments   Date Time Provider Department Center   8/5/2025  9:30 AM Eduar Gastelum MD [facility]         "

## 2025-04-10 ENCOUNTER — OFFICE VISIT (OUTPATIENT)
Dept: PRIMARY CARE CLINIC | Facility: CLINIC | Age: 40
End: 2025-04-10
Payer: COMMERCIAL

## 2025-04-10 VITALS
WEIGHT: 244 LBS | BODY MASS INDEX: 32.34 KG/M2 | OXYGEN SATURATION: 98 % | HEIGHT: 73 IN | HEART RATE: 65 BPM | SYSTOLIC BLOOD PRESSURE: 131 MMHG | TEMPERATURE: 99 F | DIASTOLIC BLOOD PRESSURE: 78 MMHG

## 2025-04-10 DIAGNOSIS — I10 PRIMARY HYPERTENSION: Primary | ICD-10-CM

## 2025-04-10 RX ORDER — LISINOPRIL AND HYDROCHLOROTHIAZIDE 10; 12.5 MG/1; MG/1
1 TABLET ORAL DAILY
Qty: 30 TABLET | Refills: 5 | Status: SHIPPED | OUTPATIENT
Start: 2025-04-10 | End: 2025-10-07

## 2025-04-22 ENCOUNTER — TELEPHONE (OUTPATIENT)
Dept: PRIMARY CARE CLINIC | Facility: CLINIC | Age: 40
End: 2025-04-22
Payer: COMMERCIAL

## 2025-04-22 DIAGNOSIS — I10 PRIMARY HYPERTENSION: ICD-10-CM

## 2025-04-22 RX ORDER — LISINOPRIL AND HYDROCHLOROTHIAZIDE 10; 12.5 MG/1; MG/1
1 TABLET ORAL DAILY
Qty: 30 TABLET | Refills: 5 | Status: SHIPPED | OUTPATIENT
Start: 2025-04-22 | End: 2025-10-19

## 2025-04-22 NOTE — TELEPHONE ENCOUNTER
----- Message from Ashley sent at 4/22/2025  9:50 AM CDT -----  .Who Called: Da Prattreferred Method of Contact: Phone CallPatient's Preferred Phone Number on File: [phone] Best Call Back Number, if different:Additional Information: lisinopriL-hydrochlorothiazide (PRINZIDE,ZESTORETIC) 10-12.5 mg per tablet wasn't sent in

## 2025-06-26 NOTE — PROGRESS NOTES
"Medication Refill (Out x 1 week )       HPI:    Patient presents for 3 month recheck/refill medications.  Patient states medications are working well; he is able to concentrate, focus and stay on task.  He denies insomnia, anxiety, palpitations, unintentional weight loss, headaches or dry mouth.     reviewed.       Current Outpatient Medications   Medication Instructions    ascorbic acid (vitamin C) (VITAMIN C) 750 mg, Daily    lisdexamfetamine (VYVANSE) 50 mg, Oral, Every morning    lisdexamfetamine (VYVANSE) 50 mg, Oral, Every morning, Fill: 8/01/2025    lisdexamfetamine (VYVANSE) 50 mg, Oral, Every morning, Fill: 9/01/2025.    lisinopriL-hydrochlorothiazide (PRINZIDE,ZESTORETIC) 10-12.5 mg per tablet 1 tablet, Oral, Daily    multivitamin (THERAGRAN) tablet 1 tablet, Daily         ROS:    Patient has been feeling well.    He has been sleeping well.    His appetite is good.  His blood pressures have been controlled; he checks them infrequently.      PE:    ..Visit Vitals  /83   Pulse 64   Temp 98 °F (36.7 °C) (Oral)   Resp 18   Ht 6' 1" (1.854 m)   Wt 112.9 kg (249 lb)   SpO2 96%   BMI 32.85 kg/m²        General:  He is well-developed well-nourished white male in no apparent distress.  He is alert and oriented.    Chest: Clear to auscultation bilaterally.    CV: Regular rate rhythm without murmurs rubs or gallops.  Bilateral lower extremities: Without edema        1. ADHD (attention deficit hyperactivity disorder), inattentive type  Overview:  Stable/patient doing well on current treatment, will continue to closely monitor, follow-up in 3 months. 3 prescriptions printed and hand given to patient at office visit today for 3-month supply. Risks/benefits/side effects of medication discussed with patient. Patient denies any insomnia or palpitations.    Narcotics agreement updated 01/11/2023.    07/20/2023: Patient is doing well on medications; refills x3 given.    10/20/2023: Patient is doing well on " medications; refills x3 given.    01/18/2024: Patient is doing well on medications; refills x3 given.  Narcotics agreement updated.    04/23/2024:  Patient is doing well on medications; refills x3 given.      07/30/2024: Patient is doing well on medications; refills x3 given.    Patient advised to not take medication while blood pressure is elevated.          Assessment & Plan:  07/01/2025: Patient is doing well on medications; refills x3 given.    Orders:  -     lisdexamfetamine (VYVANSE) 50 MG capsule; Take 1 capsule (50 mg total) by mouth every morning.  Dispense: 30 capsule; Refill: 0  -     lisdexamfetamine (VYVANSE) 50 MG capsule; Take 1 capsule (50 mg total) by mouth every morning. Fill: 8/01/2025  Dispense: 30 capsule; Refill: 0  -     lisdexamfetamine (VYVANSE) 50 MG capsule; Take 1 capsule (50 mg total) by mouth every morning. Fill: 9/01/2025.  Dispense: 30 capsule; Refill: 0    2. Primary hypertension  Overview:  04/10/2025: Patient's blood pressure dropped too low with full dosage of lisinopril HCT 10-12.5.  Therefore, patient has been taking 1/2 tablet.  Home blood pressures has been in the 110s over 70s.  Continue taking 1/2 tablet daily; refills sent.  Patient advised he may resume weight lifting.    Assessment & Plan:  07/01/2025:  His blood pressure is controlled; he checks pressures infrequently at home.    Continue current medication.                ..Follow up in about 3 months (around 10/1/2025) for Follow Up.       Future Appointments   Date Time Provider Department Center   8/5/2025  9:30 AM [facility]

## 2025-07-01 ENCOUNTER — OFFICE VISIT (OUTPATIENT)
Dept: PRIMARY CARE CLINIC | Facility: CLINIC | Age: 40
End: 2025-07-01
Payer: COMMERCIAL

## 2025-07-01 VITALS
WEIGHT: 249 LBS | DIASTOLIC BLOOD PRESSURE: 83 MMHG | OXYGEN SATURATION: 96 % | HEART RATE: 64 BPM | RESPIRATION RATE: 18 BRPM | BODY MASS INDEX: 33 KG/M2 | SYSTOLIC BLOOD PRESSURE: 130 MMHG | HEIGHT: 73 IN | TEMPERATURE: 98 F

## 2025-07-01 DIAGNOSIS — I10 PRIMARY HYPERTENSION: ICD-10-CM

## 2025-07-01 DIAGNOSIS — F90.0 ADHD (ATTENTION DEFICIT HYPERACTIVITY DISORDER), INATTENTIVE TYPE: Primary | ICD-10-CM

## 2025-07-01 RX ORDER — LISDEXAMFETAMINE DIMESYLATE 50 MG/1
50 CAPSULE ORAL EVERY MORNING
Qty: 30 CAPSULE | Refills: 0 | Status: SHIPPED | OUTPATIENT
Start: 2025-07-01 | End: 2025-07-31

## 2025-07-01 RX ORDER — LISDEXAMFETAMINE DIMESYLATE 50 MG/1
50 CAPSULE ORAL EVERY MORNING
Qty: 30 CAPSULE | Refills: 0 | Status: SHIPPED | OUTPATIENT
Start: 2025-07-01

## 2025-07-01 NOTE — ASSESSMENT & PLAN NOTE
07/01/2025:  His blood pressure is controlled; he checks pressures infrequently at home.    Continue current medication.

## 2025-07-29 DIAGNOSIS — Z00.00 ENCOUNTER FOR WELLNESS EXAMINATION IN ADULT: Primary | ICD-10-CM

## 2025-07-29 DIAGNOSIS — Z13.220 SCREENING FOR LIPID DISORDERS: ICD-10-CM

## 2025-07-29 DIAGNOSIS — I10 PRIMARY HYPERTENSION: ICD-10-CM

## 2025-07-29 DIAGNOSIS — Z13.1 SCREENING FOR DIABETES MELLITUS: ICD-10-CM
